# Patient Record
Sex: MALE | Race: WHITE | HISPANIC OR LATINO
[De-identification: names, ages, dates, MRNs, and addresses within clinical notes are randomized per-mention and may not be internally consistent; named-entity substitution may affect disease eponyms.]

---

## 2018-11-17 ENCOUNTER — TRANSCRIPTION ENCOUNTER (OUTPATIENT)
Age: 48
End: 2018-11-17

## 2021-04-13 ENCOUNTER — EMERGENCY (EMERGENCY)
Facility: HOSPITAL | Age: 51
LOS: 1 days | Discharge: HOME | End: 2021-04-13
Attending: STUDENT IN AN ORGANIZED HEALTH CARE EDUCATION/TRAINING PROGRAM | Admitting: STUDENT IN AN ORGANIZED HEALTH CARE EDUCATION/TRAINING PROGRAM
Payer: COMMERCIAL

## 2021-04-13 VITALS
DIASTOLIC BLOOD PRESSURE: 92 MMHG | TEMPERATURE: 98 F | RESPIRATION RATE: 18 BRPM | HEART RATE: 80 BPM | SYSTOLIC BLOOD PRESSURE: 128 MMHG | OXYGEN SATURATION: 100 %

## 2021-04-13 VITALS
HEART RATE: 92 BPM | DIASTOLIC BLOOD PRESSURE: 83 MMHG | TEMPERATURE: 98 F | SYSTOLIC BLOOD PRESSURE: 129 MMHG | OXYGEN SATURATION: 100 % | RESPIRATION RATE: 18 BRPM

## 2021-04-13 PROCEDURE — 99284 EMERGENCY DEPT VISIT MOD MDM: CPT

## 2021-04-13 NOTE — ED PROVIDER NOTE - OBJECTIVE STATEMENT
51 y/o M with no PMH p/w Right eye flashes and floaters, hx of worse vision in right , left 20/20 w/ glasses, Pressures 12/13. He states that since yesterday he had some flashes of light in his right eye accompanied by floaters ar 11-1 oclock position in the right eye. He states he has worse vision in the right eye but didn't get the correct lens for the vision due to it being much thicker. He denies head trauma, bleeding disorder, fever, chills, eye pain. he states he still sees some floaters in the right eye.

## 2021-04-13 NOTE — ED PROVIDER NOTE - NSFOLLOWUPINSTRUCTIONS_ED_ALL_ED_FT
During your ED visit you were evaluated for right eye floater. You were seen by ophthalmology, follow up tomorrow. Ophthalmology on Wednesday, April 14th at 1:00pm.  [address]  [place] [address] [phone]  Return to the ED if you exhibit any new, continued or worsening symptoms.

## 2021-04-13 NOTE — CONSULT NOTE ADULT - SUBJECTIVE AND OBJECTIVE BOX
[facility] DEPARTMENT OF OPHTHALMOLOGY - INITIAL ADULT CONSULT  -----------------------------------------------------------------------------------------------------------------  Alberto Jones MD PGY 2  Pager: [phone]  -----------------------------------------------------------------------------------------------------------------    HPI: 51 y/o M with no PMH, POH of myopia/astigmatism both eyes, poor vision right eye since he could remember who presented to ED for new floaters/flashes right eye. Pt reports that yesterday morning at 5:30am, he turned his head and noticed 4 episodes of 1 flash of light in the right eye. Today, pt noticed 3 dots with a line squiggly black line coming off of the dots. Denies any curtain-like visual field defect. Patient has 1 floater right eye at baseline. Pt does not know if his vision in the right eye is worse than that of baseline. Of note, patient is very myopic both eyes and required a very strong prescription in the right eye with poor vision of the right eye since he was very young. Patient now wears glasses prescription for left eye with a balanced prescription for the right eye. Pt last saw eye doctor (BECKY Cruz 3 eye) before pandemic.    Past Medical History: None  Past Ocular History: Myopic/astigmatism both eyes, poor vision right eye since childhood  Drops: None  Allergies: No known allergies to medications  Family History: Brother: severe myopia/astigmatism   Surgical History: Denies eye surgeries.   Outpatient Ophthalmologist: BECKY [place] [id number] eye office ([facility]), last seen before pandemic.       Review of Systems:  Constitutional: No fever, chills  Eyes: Admits to flashes and floaters right eye. No blurry vision, FBS, erythema, discharge, double vision, OU  Neuro: No tremors  Cardiovascular: No chest pain, palpitations  Respiratory: No SOB, no cough  GI: No nausea, vomiting, abdominal pain    Vital Signs: T(C): 36.9 (04-13-21 @ 21:00)  T(F): 98.4 (04-13-21 @ 21:00), Max: 98.4 (04-13-21 @ 21:00)  HR: 80 (04-13-21 @ 21:00) (80 - 92)  BP: 128/92 (04-13-21 @ 21:00) (128/92 - 129/83)  RR:  (18 - 18)  SpO2:  (100% - 100%)  Wt(kg): --  General: AAO x 3, appropriate mood and affect    Ophthalmology Exam:  Visual acuity (cc): 20/200 (ph 20/70) OD, 20/25 OS  Pupils: PERRL OU, no APD  Intraocular Pressure: 9, 9    Extraocular movements (EOMs): Full OU, no pain, no diplopia.  Confrontational Visual Field (CVF): Full OU.  Color Plates: 4/12 OD, 12/12 OS    Pen Light Exam (PLE)  External: Normal OU.  Lids/Lashes/Lacrimal Ducts: Flat OU.  Sclera/Conjunctiva: White and quiet OU.  Cornea: Clear OU.  Anterior Chamber: Deep and formed OU.    Iris: Flat OU.  Lens: Clear OU.    Fundus Exam: dilated with 1% tropicamide and 2.5% phenylephrine 11:48pm  Approval obtained from primary team for dilation  Patient aware that pupils can remained dilated for at least 4-6 hours.  Exam performed with 20 D lens  _________________    Assessment and Recommendations:  50y male with a past medical history/ocular history of severe myopia/astigmatism both eyes, poor vision right eye since childhood consulted for new floaters, flashes right eye.    1.  -Pt reports that yesterday morning at 5:30am, he experienced 4 episodes of 1 flash of light in the right eye. Today, pt noticed 3 black dots with a line squiggly black line coming off of the dots. Denied any curtain-like visual field defect. Patient has 1 floater right eye at baseline.  -Of note, patient has a history of severe myopia/astigmatism both eyes (right >> left) with chronically poor vision in the right eye since childhood, most likely refractive amblyopia.      Outpatient Follow-up: Patient should follow-up with [facility] Department of Ophthalmology on Wednesday, April 14th at 1:00pm.    [address]  [place] [address]  [phone]    Alberto Jones MD, PGY-2  Pager: [phone]  Also available on Microsoft Teams       [facility] DEPARTMENT OF OPHTHALMOLOGY - INITIAL ADULT CONSULT  -----------------------------------------------------------------------------------------------------------------  Alberto Jones MD PGY 2  Pager: [phone]  -----------------------------------------------------------------------------------------------------------------    HPI: 51 y/o M with no PMH, POH of myopia/astigmatism both eyes, poor vision right eye since he could remember who presented to ED for new floaters/flashes right eye. Pt reports that yesterday morning at 5:30am, he turned his head and noticed 4 episodes of 1 flash of light in the right eye. Today, pt noticed 3 dots with a line squiggly black line coming off of the dots. Denies any curtain-like visual field defect. Patient has 1 floater right eye at baseline. Pt does not know if his vision in the right eye is worse than that of baseline. Of note, patient is very myopic both eyes and required a very strong prescription in the right eye with poor vision of the right eye since he was very young. Patient now wears glasses prescription for left eye with a balanced prescription for the right eye. Pt last saw eye doctor (BECKY Cruz 3 eye) before pandemic.    Past Medical History: None  Past Ocular History: Myopic/astigmatism both eyes, poor vision right eye since childhood  Drops: None  Allergies: No known allergies to medications  Family History: Brother: severe myopia/astigmatism   Surgical History: Denies eye surgeries.   Outpatient Ophthalmologist: BECKY [place] [id number] eye office ([facility]), last seen before pandemic.       Review of Systems:  Constitutional: No fever, chills  Eyes: Admits to flashes and floaters right eye. No blurry vision, FBS, erythema, discharge, double vision, OU  Neuro: No tremors  Cardiovascular: No chest pain, palpitations  Respiratory: No SOB, no cough  GI: No nausea, vomiting, abdominal pain    Vital Signs: T(C): 36.9 (04-13-21 @ 21:00)  T(F): 98.4 (04-13-21 @ 21:00), Max: 98.4 (04-13-21 @ 21:00)  HR: 80 (04-13-21 @ 21:00) (80 - 92)  BP: 128/92 (04-13-21 @ 21:00) (128/92 - 129/83)  RR:  (18 - 18)  SpO2:  (100% - 100%)  Wt(kg): --  General: AAO x 3, appropriate mood and affect    Ophthalmology Exam:  Visual acuity (cc): 20/200 (ph 20/70) OD, 20/25 OS  Pupils: PERRL OU, no APD  Intraocular Pressure: 9, 9    Extraocular movements (EOMs): Full OU, no pain, no diplopia.  Confrontational Visual Field (CVF): Full OU.  Color Plates: 4/12 OD, 12/12 OS    Pen Light Exam (PLE)  External: Normal OU.  Lids/Lashes/Lacrimal Ducts: Flat OU.  Sclera/Conjunctiva: White and quiet OU. Conjunctival melanosis superotemporal OD. Conjunctival melanosis OS.  Cornea: Clear OU.  Anterior Chamber: Deep and formed OU.    Iris: Flat OU.  Lens: Trace NS OU    Fundus Exam: dilated with 1% tropicamide and 2.5% phenylephrine 11:48pm  Approval obtained from primary team for dilation  Patient aware that pupils can remained dilated for at least 4-6 hours.  Exam performed with 20 D lens  _________________    Assessment and Recommendations:  50y male with a past medical history/ocular history of severe myopia/astigmatism both eyes, poor vision right eye since childhood consulted for new floaters, flashes right eye.    1.  -Pt reports that yesterday morning at 5:30am, he experienced 4 episodes of 1 flash of light in the right eye. Today, pt noticed 3 black dots with a line squiggly black line coming off of the dots. Denied any curtain-like visual field defect. Patient has 1 floater right eye at baseline.  -Of note, patient has a history of severe myopia/astigmatism both eyes (right >> left) with chronically poor vision in the right eye since childhood, most likely refractive amblyopia.      Outpatient Follow-up: Patient should follow-up with [facility] Department of Ophthalmology on Wednesday, April 14th at 1:00pm.    [address]  [place] [address]  [phone]    Alberto Jones MD, PGY-2  Pager: [phone]  Also available on Microsoft Teams       [facility] DEPARTMENT OF OPHTHALMOLOGY - INITIAL ADULT CONSULT  -----------------------------------------------------------------------------------------------------------------  Alberto Jones MD PGY 2  Pager: [phone]  -----------------------------------------------------------------------------------------------------------------    HPI: 49 y/o M with no PMH, POH of myopia/astigmatism both eyes, poor vision right eye since he could remember who presented to ED for new floaters/flashes right eye. Pt reports that yesterday morning at 5:30am, he turned his head and noticed 4 episodes of 1 flash of light in the right eye. Today, pt noticed 3 dots with a line squiggly black line coming off of the dots. Denies any curtain-like visual field defect. Patient has 1 floater right eye at baseline. Pt does not know if his vision in the right eye is worse than that of baseline. Of note, patient is very myopic both eyes and required a very strong prescription in the right eye with poor vision of the right eye since he was very young. Patient now wears glasses prescription for left eye with a balanced prescription for the right eye. Pt last saw eye doctor (BECKY Cruz 3 eye) before pandemic.    Past Medical History: None  Past Ocular History: Myopic/astigmatism both eyes, poor vision right eye since childhood  Drops: None  Allergies: No known allergies to medications  Family History: Brother: severe myopia/astigmatism   Surgical History: Denies eye surgeries.   Outpatient Ophthalmologist: BECKY [place] [id number] eye office ([facility]), last seen before pandemic.       Review of Systems:  Constitutional: No fever, chills  Eyes: Admits to flashes and floaters right eye. No blurry vision, FBS, erythema, discharge, double vision, OU  Neuro: No tremors  Cardiovascular: No chest pain, palpitations  Respiratory: No SOB, no cough  GI: No nausea, vomiting, abdominal pain    Vital Signs: T(C): 36.9 (04-13-21 @ 21:00)  T(F): 98.4 (04-13-21 @ 21:00), Max: 98.4 (04-13-21 @ 21:00)  HR: 80 (04-13-21 @ 21:00) (80 - 92)  BP: 128/92 (04-13-21 @ 21:00) (128/92 - 129/83)  RR:  (18 - 18)  SpO2:  (100% - 100%)  Wt(kg): --  General: AAO x 3, appropriate mood and affect    Ophthalmology Exam:  Visual acuity (cc): 20/200 (ph 20/70) OD, 20/25 OS  Pupils: PERRL OU, no APD  Intraocular Pressure: 9, 9    Extraocular movements (EOMs): Full OU, no pain, no diplopia.  Confrontational Visual Field (CVF): Full OU.  Color Plates: 4/12 OD, 12/12 OS    Pen Light Exam (PLE)  External: Normal OU.  Lids/Lashes/Lacrimal Ducts: Flat OU.  Sclera/Conjunctiva: White and quiet OU. Conjunctival melanosis superotemporal OD. Conjunctival melanosis OS.  Cornea: Clear OU.  Anterior Chamber: Deep and formed OU.    Iris: Flat OU.  Lens: Trace NS OU    Fundus Exam: dilated with 1% tropicamide and 2.5% phenylephrine 11:48pm  Approval obtained from primary team for dilation  Patient aware that pupils can remained dilated for at least 4-6 hours.  Exam performed with 20 D lens    Vitreous: PVD OD, within normal limits OS. Neg [place] sign.  Disc, cup/disc: sharp and pink, 0.30, tilted OU. PPA OD.   Macula: within normal limits OU  Vessels: within normal limits OU    Assessment and Recommendations:  50y male with a past medical history/ocular history of severe myopia/astigmatism both eyes, poor vision right eye since childhood consulted for new floaters, flashes right eye.    1. Posterior vitreous detachment right eye  -Pt reports that yesterday morning at 5:30am, he experienced 4 episodes of 1 flash of light in the right eye. Today, pt noticed 3 black dots with a line squiggly black line coming off of the dots. Denied any curtain-like visual field defect. Patient has 1 floater right eye at baseline.  -Of note, patient has a history of severe myopia/astigmatism both eyes (right >> left) with chronically poor vision in the right eye since childhood, most likely refractive amblyopia.  -On dilated fundus exam, posterior vitreous detachment seen in right eye. No tears or detachment seen both eyes. Negative [place] sign.  -Patient advised to go to ED or to eye clinic if any of the following symptoms are experienced: increase in floaters or flashing lights, worsening vision, or the appearance of a persistent curtain or shadow anywhere in the field of vision.  -Patient informed he will likely develop PVD in left eye as well.      Outpatient Follow-up: Patient should follow-up with [facility] Department of Ophthalmology retina clinic on Wednesday, April 14th at 2:30pm for repeat DFE.    [address]  [place] [address]  [phone]    Alberto Jones MD, PGY-2  Pager: [phone]  Also available on Microsoft Teams       [facility] DEPARTMENT OF OPHTHALMOLOGY - INITIAL ADULT CONSULT  -----------------------------------------------------------------------------------------------------------------  Alberto Jones MD PGY 2  Pager: [phone]  -----------------------------------------------------------------------------------------------------------------    HPI: 51 y/o M with no PMH, POH of myopia/astigmatism both eyes, poor vision right eye since he could remember who presented to ED for new floaters/flashes right eye. Pt reports that yesterday morning at 5:30am, he turned his head and noticed 4 episodes of 1 flash of light in the right eye. Today, pt noticed 3 dots with a line squiggly black line coming off of the dots. Denies any curtain-like visual field defect. Patient has 1 floater right eye at baseline. Pt does not know if his vision in the right eye is worse than that of baseline. Of note, patient is very myopic both eyes and required a very strong prescription in the right eye with poor vision of the right eye since he was very young. Patient now wears glasses prescription for left eye with a balanced prescription for the right eye. Pt last saw eye doctor (BECKY Cruz 3 eye) before pandemic.    Past Medical History: None  Past Ocular History: Myopic/astigmatism both eyes, poor vision right eye since childhood  Drops: None  Allergies: No known allergies to medications  Family History: Brother: severe myopia/astigmatism   Surgical History: Denies eye surgeries.   Outpatient Ophthalmologist: BECKY [place] [id number] eye office ([facility]), last seen before pandemic.       Review of Systems:  Constitutional: No fever, chills  Eyes: Admits to flashes and floaters right eye. No blurry vision, FBS, erythema, discharge, double vision, OU  Neuro: No tremors  Cardiovascular: No chest pain, palpitations  Respiratory: No SOB, no cough  GI: No nausea, vomiting, abdominal pain    Vital Signs: T(C): 36.9 (04-13-21 @ 21:00)  T(F): 98.4 (04-13-21 @ 21:00), Max: 98.4 (04-13-21 @ 21:00)  HR: 80 (04-13-21 @ 21:00) (80 - 92)  BP: 128/92 (04-13-21 @ 21:00) (128/92 - 129/83)  RR:  (18 - 18)  SpO2:  (100% - 100%)  Wt(kg): --  General: AAO x 3, appropriate mood and affect    Ophthalmology Exam:  Visual acuity (cc): 20/200 (ph 20/70) OD, 20/25 OS  Pupils: PERRL OU, no APD  Intraocular Pressure: 9, 9    Extraocular movements (EOMs): Full OU, no pain, no diplopia.  Confrontational Visual Field (CVF): Full OU.  Color Plates: 4/12 OD, 12/12 OS    Slit Lamp Exam  External: Normal OU.  Lids/Lashes/Lacrimal Ducts: Flat OU.  Sclera/Conjunctiva: White and quiet OU. Conjunctival melanosis superotemporal OD. Conjunctival melanosis OS.  Cornea: Clear OU.  Anterior Chamber: Deep and formed OU.    Iris: Flat OU.  Lens: Trace NS OU    Fundus Exam: dilated with 1% tropicamide and 2.5% phenylephrine 11:48pm  Approval obtained from primary team for dilation  Patient aware that pupils can remained dilated for at least 4-6 hours.  Exam performed with 20 D lens    Vitreous: PVD OD, within normal limits OS. Neg [place] sign.  Disc, cup/disc: sharp and pink, 0.30, tilted OU. PPA OD.   Macula: within normal limits OU  Vessels: within normal limits OU    Assessment and Recommendations:  50y male with a past medical history/ocular history of severe myopia/astigmatism both eyes, poor vision right eye since childhood consulted for new floaters, flashes right eye.    1. Posterior vitreous detachment right eye  -Pt reports that yesterday morning at 5:30am, he experienced 4 episodes of 1 flash of light in the right eye. Today, pt noticed 3 black dots with a line squiggly black line coming off of the dots. Denied any curtain-like visual field defect. Patient has 1 floater right eye at baseline.  -Of note, patient has a history of severe myopia/astigmatism both eyes (right >> left) with chronically poor vision in the right eye since childhood, most likely refractive amblyopia.  -On dilated fundus exam, posterior vitreous detachment seen in right eye. No tears or detachment seen both eyes. Negative [place] sign.  -Patient advised to go to ED or to eye clinic if any of the following symptoms are experienced: increase in floaters or flashing lights, worsening vision, or the appearance of a persistent curtain or shadow anywhere in the field of vision.  -Patient informed he will likely develop PVD in left eye as well.      Outpatient Follow-up: Patient should follow-up with [facility] Department of Ophthalmology retina clinic on Wednesday, April 14th at 2:30pm for repeat DFE.    [address]  [place] [address]  [phone]    Alberto Jones MD, PGY-2  Pager: [phone]  Also available on Microsoft Teams       [facility] DEPARTMENT OF OPHTHALMOLOGY - INITIAL ADULT CONSULT  -----------------------------------------------------------------------------------------------------------------  Alberto Jones MD PGY 2  Pager: [phone]  -----------------------------------------------------------------------------------------------------------------    HPI: 51 y/o M with no PMH, POH of myopia/astigmatism both eyes, poor vision right eye since he could remember who presented to ED for new floaters/flashes right eye. Pt reports that yesterday morning at 5:30am, he turned his head and noticed 4 episodes of 1 flash of light in the right eye. Today, pt noticed 3 dots with a line squiggly black line coming off of the dots. Denies any curtain-like visual field defect. Patient has 1 floater right eye at baseline. Pt does not know if his vision in the right eye is worse than that of baseline. Of note, patient is very myopic both eyes and required a very strong prescription in the right eye with poor vision of the right eye since he was very young. Patient now wears glasses prescription for left eye with a balanced prescription for the right eye. Pt last saw eye doctor (BECKY Cruz 3 eye) before pandemic.    Past Medical History: None  Past Ocular History: Myopic/astigmatism both eyes, poor vision right eye since childhood  Drops: None  Allergies: No known allergies to medications  Family History: Brother: severe myopia/astigmatism   Surgical History: Denies eye surgeries.   Outpatient Ophthalmologist: BECKY [place] [address] eye office ([facility]), last seen before pandemic.       Review of Systems:  Constitutional: No fever, chills  Eyes: Admits to flashes and floaters right eye. No blurry vision, FBS, erythema, discharge, double vision, OU  Neuro: No tremors  Cardiovascular: No chest pain, palpitations  Respiratory: No SOB, no cough  GI: No nausea, vomiting, abdominal pain    Vital Signs: T(C): 36.9 (04-13-21 @ 21:00)  T(F): 98.4 (04-13-21 @ 21:00), Max: 98.4 (04-13-21 @ 21:00)  HR: 80 (04-13-21 @ 21:00) (80 - 92)  BP: 128/92 (04-13-21 @ 21:00) (128/92 - 129/83)  RR:  (18 - 18)  SpO2:  (100% - 100%)  Wt(kg): --  General: AAO x 3, appropriate mood and affect    Ophthalmology Exam:  Visual acuity (cc): 20/200 (ph 20/70) OD, 20/25 OS  Pupils: PERRL OU, no APD  Intraocular Pressure: 9, 9    Extraocular movements (EOMs): Full OU, no pain, no diplopia.  Confrontational Visual Field (CVF): Full OU.  Color Plates: 4/12 OD, 12/12 OS    Slit Lamp Exam  External: Normal OU.  Lids/Lashes/Lacrimal Ducts: Flat OU.  Sclera/Conjunctiva: White and quiet OU. Conjunctival melanosis superotemporal OD. Conjunctival melanosis OS.  Cornea: Clear OU.  Anterior Chamber: Deep and formed OU.    Iris: Flat OU.  Lens: Trace NS OU    Fundus Exam: dilated with 1% tropicamide and 2.5% phenylephrine 11:48pm  Approval obtained from primary team for dilation  Patient aware that pupils can remained dilated for at least 4-6 hours.  Exam performed with 20 D lens    Vitreous: PVD OD, within normal limits OS. Neg [place] sign.  Disc, cup/disc: sharp and pink, 0.30, tilted OU. PPA OD.   Macula: within normal limits OU  Vessels: within normal limits OU    Assessment and Recommendations:  50y male with a past medical history/ocular history of severe myopia/astigmatism both eyes, poor vision right eye since childhood consulted for new floaters, flashes right eye.    1. Posterior vitreous detachment right eye  -Pt reports that yesterday morning at 5:30am, he experienced 4 episodes of 1 flash of light in the right eye. Today, pt noticed 3 black dots with a line squiggly black line coming off of the dots. Denied any curtain-like visual field defect. Patient has 1 floater right eye at baseline.  -Of note, patient has a history of severe myopia/astigmatism both eyes (right >> left) with chronically poor vision in the right eye since childhood, most likely refractive amblyopia.  -On dilated fundus exam, posterior vitreous detachment seen in right eye. No tears or detachment seen both eyes. Negative [place] sign.  -Patient advised to go to ED or to eye clinic if any of the following symptoms are experienced: increase in floaters or flashing lights, worsening vision, or the appearance of a persistent curtain or shadow anywhere in the field of vision.  -Patient informed he will likely develop PVD in left eye as well.      Outpatient Follow-up: Patient should follow-up with [facility] Department of Ophthalmology retina clinic on Wednesday, April 14th at 2:30pm for repeat DFE.    [address]  [place] [address]  [phone]    Alberto Jones MD, PGY-2  Pager: [phone]  Also available on [employer] Teams    I have reviewed the residents note including the history, exam, assessment, and plan.  Pt to be seen by outpatient retina specialist today, 4/14, sooner if symptoms worsen or change.    Stefani Cage MD

## 2021-04-13 NOTE — ED PROVIDER NOTE - CARE PLAN
Principal Discharge DX:	Floaters in visual field, right   Principal Discharge DX:	Retinal detachment, right  Assessment and plan of treatment:	During your ED visit you were evaluated for right eye floater. You were seen by ophthalmology, follow up tomorrow. Ophthalmology on Wednesday, April 14th at 1:00pm.  [address]  [place] [address] [phone]  Return to the ED if you exhibit any new, continued or worsening symptoms.

## 2021-04-13 NOTE — ED PROVIDER NOTE - PHYSICAL EXAMINATION
Gen: Awake, Alert, WD, WN, NAD  Head:  NC/AT  Eyes:  PERRL, EOMI, Conjunctiva pink, lids normal, no scleral icterus, Visual acuity left   ENT: OP clear, no exudates, no erythema, uvula midline, TMs clear bilaterally, moist mucus membranes  Neck: supple, nontender, no meningismus, no JVD, trachea midline  Cardiac/CV:  S1 S2, RRR, no M/G/R  Respiratory/Pulm:  CTAB, good air movement, normal resp effort, no wheezes/stridor/retractions/rales/rhonchi  Gastrointestinal/Abdomen:  Soft, nontender, nondistended, +BS, no rebound/guarding  Back:  no CVAT, no MLT  Ext:  warm, well perfused, moving all extremities spontaneously, no peripheral edema, distal pulses intact  Skin: intact, no rash  Neuro:  AAOx3, sensation intact, motor 5/5 x 4 extremities, normal gait, speech clear

## 2021-04-13 NOTE — ED PROVIDER NOTE - PLAN OF CARE
During your ED visit you were evaluated for right eye floater. You were seen by ophthalmology, follow up tomorrow. Ophthalmology on Wednesday, April 14th at 1:00pm.  [address]  [place] [address] [phone]  Return to the ED if you exhibit any new, continued or worsening symptoms.

## 2021-04-13 NOTE — ED PROVIDER NOTE - NS ED ROS FT
denies fever, chills, chest pain, SOB, abdominal pain, diarrhea, dysuria, syncope, bleeding, new rash,weakness, numbness, +blurred vision    ROS  otherwise negative as per HPI

## 2021-04-13 NOTE — ED PROVIDER NOTE - PROGRESS NOTE DETAILS
seen by ophtalmology, + retinal detachment on right. follow up tomorrow in clinic d/c instructions given

## 2021-04-13 NOTE — ED PROVIDER NOTE - CLINICAL SUMMARY MEDICAL DECISION MAKING FREE TEXT BOX
49 y/o M with no PMH p/w Right eye flashes and floaters, hx of worse vision in right , left 20/20 w/ glasses, Pressures 12/13. He states that since yesterday he had some flashes of light in his right eye accompanied by floaters ar 11-1 oclock position in the right eye.   pt w/ normal corneal exam, floaters in right eye w/ flashing r/o retinal detachment . pressures normal , optho eval

## 2021-04-13 NOTE — ED PROVIDER NOTE - PATIENT PORTAL LINK FT
You can access the FollowMyHealth Patient Portal offered by [facility] by registering at the following website: [email or web address]. By joining [employer]’s FollowMyHealth portal, you will also be able to view your health information using other applications (apps) compatible with our system.

## 2021-04-14 ENCOUNTER — APPOINTMENT (OUTPATIENT)
Age: 51
End: 2021-04-14

## 2021-04-15 PROBLEM — Z78.9 OTHER SPECIFIED HEALTH STATUS: Chronic | Status: ACTIVE | Noted: 2021-04-13

## 2022-09-19 ENCOUNTER — APPOINTMENT (OUTPATIENT)
Dept: ORTHOPEDIC SURGERY | Facility: CLINIC | Age: 52
End: 2022-09-19

## 2022-09-19 ENCOUNTER — NON-APPOINTMENT (OUTPATIENT)
Age: 52
End: 2022-09-19

## 2022-09-19 DIAGNOSIS — M79.641 PAIN IN RIGHT HAND: ICD-10-CM

## 2022-09-19 DIAGNOSIS — M25.531 PAIN IN RIGHT WRIST: ICD-10-CM

## 2022-09-19 PROCEDURE — 99203 OFFICE O/P NEW LOW 30 MIN: CPT

## 2022-09-19 PROCEDURE — 73130 X-RAY EXAM OF HAND: CPT | Mod: RT

## 2022-09-19 NOTE — END OF VISIT
[FreeTextEntry3] : All medical record entries made by the Scribe were at my,  Dr. Logan Osorio MD., direction and personally dictated by me on 09/19/2022. I have personally reviewed the chart and agree that the record accurately reflects my personal performance of the history, physical exam, assessment and plan.

## 2022-09-19 NOTE — DISCUSSION/SUMMARY
[FreeTextEntry1] : The underlying pathophysiology was reviewed with the patient. XR films were reviewed with the patient. Discussed at length the nature of the patient’s condition.\par \par I recommended symptomatic treatment at this time consisting of use of oral and topical antiinflammatories as well as a wrist brace for support. He stated has has tried these measures. He is an  and states he does not have full use of his hand currently due to his symptoms. I therefore am referring him for an MRI of the right wrist for further evaluation.\par An MRI of the RIGHT wrist was ordered to evaluate for extensor tendinitis. Patient will follow-up to review the results and discuss further treatment recommendations.\par \par All questions answered, understanding verbalized. Patient in agreement with plan of care. Follow up after the MRI results.

## 2022-09-19 NOTE — PHYSICAL EXAM
[de-identified] : Patient is WDWN, alert, and in no acute distress. Breathing is unlabored. He is grossly oriented to person, place, and time.\par \par Right Hand:\par No deformities or ecchymosis noted.\par Edema noted dorsally.\par Focal tenderness noted dorsally over the extensor tendons of the wrist/ \par There is tenderness at the base of the fourth metacarpal. \par He has full flexion and extension of the digits.\par Sensation is intact to the digits distally. [de-identified] : AP, lateral and oblique views of the RIGHT hand were obtained today and revealed no fracture at the fourth metacarpal or otherwise. 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 Cystic changes to the proximal aspect of the ring finger middle phalanx.

## 2022-09-19 NOTE — ADDENDUM
[FreeTextEntry1] : I, Kimberly Smalls wrote this note acting as a scribe for Dr. Logan Osorio on Sep 19, 2022.